# Patient Record
Sex: FEMALE | Race: WHITE
[De-identification: names, ages, dates, MRNs, and addresses within clinical notes are randomized per-mention and may not be internally consistent; named-entity substitution may affect disease eponyms.]

---

## 2018-10-01 PROBLEM — Z00.00 ENCOUNTER FOR PREVENTIVE HEALTH EXAMINATION: Status: ACTIVE | Noted: 2018-10-01

## 2018-10-31 ENCOUNTER — APPOINTMENT (OUTPATIENT)
Dept: CARDIOLOGY | Facility: CLINIC | Age: 53
End: 2018-10-31
Payer: COMMERCIAL

## 2018-10-31 ENCOUNTER — NON-APPOINTMENT (OUTPATIENT)
Age: 53
End: 2018-10-31

## 2018-10-31 VITALS
WEIGHT: 156 LBS | HEART RATE: 56 BPM | DIASTOLIC BLOOD PRESSURE: 68 MMHG | SYSTOLIC BLOOD PRESSURE: 110 MMHG | HEIGHT: 64 IN | BODY MASS INDEX: 26.63 KG/M2 | OXYGEN SATURATION: 98 %

## 2018-10-31 PROCEDURE — 99205 OFFICE O/P NEW HI 60 MIN: CPT

## 2018-10-31 PROCEDURE — 93000 ELECTROCARDIOGRAM COMPLETE: CPT

## 2018-10-31 RX ORDER — ESCITALOPRAM OXALATE 10 MG/1
10 TABLET, FILM COATED ORAL DAILY
Refills: 0 | Status: ACTIVE | COMMUNITY

## 2018-10-31 RX ORDER — POTASSIUM BICARB/MAG COMBO 21 500-300 MG
POWDER EFFERVESCENT (GRAM) ORAL
Refills: 0 | Status: ACTIVE | COMMUNITY

## 2018-10-31 RX ORDER — CHOLECALCIFEROL (VITAMIN D3) 125 MCG
5000 CAPSULE ORAL
Refills: 0 | Status: ACTIVE | COMMUNITY

## 2018-10-31 RX ORDER — ALPRAZOLAM 0.25 MG/1
0.25 TABLET ORAL
Refills: 0 | Status: ACTIVE | COMMUNITY

## 2018-10-31 RX ORDER — MULTIVITAMIN
TABLET ORAL
Refills: 0 | Status: ACTIVE | COMMUNITY

## 2018-11-05 PROCEDURE — 93224 XTRNL ECG REC UP TO 48 HRS: CPT

## 2018-11-06 ENCOUNTER — RECORD ABSTRACTING (OUTPATIENT)
Age: 53
End: 2018-11-06

## 2018-11-06 DIAGNOSIS — Z78.9 OTHER SPECIFIED HEALTH STATUS: ICD-10-CM

## 2018-11-09 ENCOUNTER — APPOINTMENT (OUTPATIENT)
Dept: CARDIOLOGY | Facility: CLINIC | Age: 53
End: 2018-11-09
Payer: COMMERCIAL

## 2018-11-09 VITALS
BODY MASS INDEX: 26.63 KG/M2 | HEART RATE: 50 BPM | WEIGHT: 156 LBS | DIASTOLIC BLOOD PRESSURE: 72 MMHG | SYSTOLIC BLOOD PRESSURE: 120 MMHG | OXYGEN SATURATION: 100 % | HEIGHT: 64 IN

## 2018-11-09 PROCEDURE — 99214 OFFICE O/P EST MOD 30 MIN: CPT

## 2018-11-09 NOTE — PHYSICAL EXAM
[General Appearance - Well Developed] : well developed [Normal Appearance] : normal appearance [Well Groomed] : well groomed [General Appearance - Well Nourished] : well nourished [No Deformities] : no deformities [General Appearance - In No Acute Distress] : no acute distress [Normal Conjunctiva] : the conjunctiva exhibited no abnormalities [Eyelids - No Xanthelasma] : the eyelids demonstrated no xanthelasmas [No Oral Pallor] : no oral pallor [No Oral Cyanosis] : no oral cyanosis [No Jugular Venous De León A Waves] : no jugular venous de león A waves [Respiration, Rhythm And Depth] : normal respiratory rhythm and effort [Exaggerated Use Of Accessory Muscles For Inspiration] : no accessory muscle use [Auscultation Breath Sounds / Voice Sounds] : lungs were clear to auscultation bilaterally [Heart Rate And Rhythm] : heart rate and rhythm were normal [Heart Sounds] : normal S1 and S2 [Murmurs] : no murmurs present [Abdomen Soft] : soft [Abdomen Tenderness] : non-tender [Abdomen Mass (___ Cm)] : no abdominal mass palpated [Abnormal Walk] : normal gait [Gait - Sufficient For Exercise Testing] : the gait was sufficient for exercise testing [Nail Clubbing] : no clubbing of the fingernails [Cyanosis, Localized] : no localized cyanosis [Petechial Hemorrhages (___cm)] : no petechial hemorrhages [Skin Color & Pigmentation] : normal skin color and pigmentation [] : no rash [No Venous Stasis] : no venous stasis [Skin Lesions] : no skin lesions [No Skin Ulcers] : no skin ulcer [No Xanthoma] : no  xanthoma was observed [Oriented To Time, Place, And Person] : oriented to person, place, and time [Affect] : the affect was normal [Mood] : the mood was normal [No Anxiety] : not feeling anxious [Edema] : no peripheral edema present

## 2018-11-09 NOTE — HISTORY OF PRESENT ILLNESS
[FreeTextEntry1] : Ms. Torres is a very pleasant 53-year-old patient looking much younger than stated age came for cardiac evaluation. Last seen by Dr. Peterson on 10/31/18.  Apparently, her boyfriend had kidney failure and she is planning for kidney donation.  Second biggest issue is that she has father who was not aware of any kind of coronary artery disease, was it asymptomatic, he had massive heart attack at age 60, at that time he was told to have cardiomyopathy, subsequently had heart transplantation and he survived four years after heart transplantation.  The patient is very much concerned about underlying CAD.\par \par The patient is very active and does get short of breath easily but denies any beatriz chest pain associated with exertion. There is occasional chest tightness that she attributes to taking a new yoga class. She was having this tightness while wearing her holter monitor and was told to come in for reevaluation because of symptoms. She is aware of the heart beat.  She also mentions she gets tachycardic very fast whenever she does any activity.  She denies any PND, orthopnea, diaphoresis, dizziness, or pedal edema. \par \par No prior history of CHF, MI, or syncope.\par \par No history of diabetes, hypertension, or high cholesterol.\par \par CAD RISK FACTORS:\par Her CAD risk factor includes strong family history of coronary artery disease.\par \par Holter monitor has been reviewed. Normal sinus rhythm range , average of 64. Rare PACs were noted. \par

## 2018-11-16 ENCOUNTER — APPOINTMENT (OUTPATIENT)
Dept: CARDIOLOGY | Facility: CLINIC | Age: 53
End: 2018-11-16
Payer: COMMERCIAL

## 2018-11-16 PROCEDURE — 93306 TTE W/DOPPLER COMPLETE: CPT

## 2018-12-06 ENCOUNTER — APPOINTMENT (OUTPATIENT)
Dept: CARDIOLOGY | Facility: CLINIC | Age: 53
End: 2018-12-06
Payer: COMMERCIAL

## 2018-12-06 PROCEDURE — 78452 HT MUSCLE IMAGE SPECT MULT: CPT

## 2018-12-06 PROCEDURE — 93015 CV STRESS TEST SUPVJ I&R: CPT

## 2018-12-06 PROCEDURE — A9502: CPT

## 2018-12-13 ENCOUNTER — APPOINTMENT (OUTPATIENT)
Dept: CARDIOLOGY | Facility: CLINIC | Age: 53
End: 2018-12-13
Payer: COMMERCIAL

## 2018-12-13 VITALS
WEIGHT: 153 LBS | HEIGHT: 64 IN | BODY MASS INDEX: 26.12 KG/M2 | DIASTOLIC BLOOD PRESSURE: 60 MMHG | SYSTOLIC BLOOD PRESSURE: 90 MMHG | OXYGEN SATURATION: 100 % | HEART RATE: 60 BPM

## 2018-12-13 DIAGNOSIS — I34.1 NONRHEUMATIC MITRAL (VALVE) PROLAPSE: ICD-10-CM

## 2018-12-13 DIAGNOSIS — K21.9 GASTRO-ESOPHAGEAL REFLUX DISEASE W/OUT ESOPHAGITIS: ICD-10-CM

## 2018-12-13 DIAGNOSIS — R07.89 OTHER CHEST PAIN: ICD-10-CM

## 2018-12-13 DIAGNOSIS — F41.9 ANXIETY DISORDER, UNSPECIFIED: ICD-10-CM

## 2018-12-13 DIAGNOSIS — Z82.49 FAMILY HISTORY OF ISCHEMIC HEART DISEASE AND OTHER DISEASES OF THE CIRCULATORY SYSTEM: ICD-10-CM

## 2018-12-13 DIAGNOSIS — R00.0 TACHYCARDIA, UNSPECIFIED: ICD-10-CM

## 2018-12-13 DIAGNOSIS — R94.31 ABNORMAL ELECTROCARDIOGRAM [ECG] [EKG]: ICD-10-CM

## 2018-12-13 PROCEDURE — 99214 OFFICE O/P EST MOD 30 MIN: CPT

## 2018-12-13 NOTE — ASSESSMENT
[FreeTextEntry1] : Tachycardia.  No documented  arrhythmia on  Holter monitor  .  She has been advised to avoid caffeine products.\par \par History of mitral valve prolapse.  Echocardiogram confirmed normal  LV wall motion, LVEF.\par \par Abnormal EKG showing Q waves ,as well as inferior T inversion, rule out CAD.   Exercise nuclear exercise stress test- normal\par \par Aggressive risk factor modification has been discussed with her at a great length.  She will be reevaluated by me as necessary.\par \par

## 2018-12-13 NOTE — HISTORY OF PRESENT ILLNESS
[FreeTextEntry1] : Ms. Torres is a very pleasant 53-year-old patient looking much younger than stated agehad  cardiac evaluation on 10/31/18. Today she is here for cardiac testing follow up.\par \par 10/31/18    HM- no arrhythmias\par \par 11/16/18    Echo- normal LVEF without valvular abnormality\par \par 12/6/18     Exercise nuclear stress test - no infarct or ischemia on perfusion scan at a good work load. \par \par Apparently, her boyfriend had kidney failure and she is planning for kidney donation.  Second biggest issue is that she has father who was not aware of any kind of coronary artery disease, was it asymptomatic, he had massive heart attack at age 60, at that time he was told to have cardiomyopathy, subsequently had heart transplantation and he survived four years after heart transplantation.  The patient is very much concerned about underlying CAD.\par \par The patient is very active and does get short of breath easily but denies any chest pain, but she is aware of the heart.  She also mentions she gets tachycardic very fast whenever she does any activity.  She denies any PND, orthopnea, diaphoresis, dizziness, or pedal edema. \par \par No prior history of CHF, MI, or syncope.\par \par No history of diabetes, hypertension, or high cholesterol.\par \par CAD RISK FACTORS:\par Her CAD risk factor includes strong family history of coronary artery disease.\par

## 2018-12-13 NOTE — PHYSICAL EXAM
[General Appearance - Well Developed] : well developed [Normal Appearance] : normal appearance [Well Groomed] : well groomed [General Appearance - Well Nourished] : well nourished [No Deformities] : no deformities [General Appearance - In No Acute Distress] : no acute distress [Normal Conjunctiva] : the conjunctiva exhibited no abnormalities [Eyelids - No Xanthelasma] : the eyelids demonstrated no xanthelasmas [Normal Oral Mucosa] : normal oral mucosa [No Oral Pallor] : no oral pallor [No Oral Cyanosis] : no oral cyanosis [Normal Jugular Venous A Waves Present] : normal jugular venous A waves present [Normal Jugular Venous V Waves Present] : normal jugular venous V waves present [No Jugular Venous De León A Waves] : no jugular venous de león A waves [Respiration, Rhythm And Depth] : normal respiratory rhythm and effort [Exaggerated Use Of Accessory Muscles For Inspiration] : no accessory muscle use [Auscultation Breath Sounds / Voice Sounds] : lungs were clear to auscultation bilaterally [Heart Rate And Rhythm] : heart rate and rhythm were normal [Heart Sounds] : normal S1 and S2 [Murmurs] : no murmurs present [Abdomen Soft] : soft [Abdomen Tenderness] : non-tender [Abdomen Mass (___ Cm)] : no abdominal mass palpated [Abnormal Walk] : normal gait [Gait - Sufficient For Exercise Testing] : the gait was sufficient for exercise testing [Nail Clubbing] : no clubbing of the fingernails [Cyanosis, Localized] : no localized cyanosis [Petechial Hemorrhages (___cm)] : no petechial hemorrhages [Skin Color & Pigmentation] : normal skin color and pigmentation [] : no rash [No Venous Stasis] : no venous stasis [Skin Lesions] : no skin lesions [No Skin Ulcers] : no skin ulcer [No Xanthoma] : no  xanthoma was observed [Oriented To Time, Place, And Person] : oriented to person, place, and time [Affect] : the affect was normal [Mood] : the mood was normal [No Anxiety] : not feeling anxious

## 2019-03-31 NOTE — ASSESSMENT
[FreeTextEntry1] : YANDEL LOPEZ is a 53 year old F who presents today Nov 09, 2018 with the above history and the following active issues: \par \par Tachycardia. No palpitations. Holter reviewed. No dysrhythmias. Max  bpm. She has been advised to avoid caffeine products and other stimulants.\par \par History of mitral valve prolapse.  Echocardiogram for LV wall motion, LVEF.\par \par Chest tightness. Abnormal EKG showing Q waves ,as well as inferior T inversion, rule out CAD.  Recommended nuclear exercise stress test to evaluate for myocardial ischemia. Advised to go to ED for emergent evaluation if chest discomfort worsens in the interim. \par \par F/U after testing to review results.\par Discussed red flag symptoms, which would warrant more urgent medical evaluation.\par Any questions and concerns were addressed and resolved. \par \par Sincerely,\par \par PALMIRA Ríos\par Patients history, testing, and plan reviewed with supervising MD: Dr. David Constantino \par \par \par 
normal LV function

## 2019-09-06 ENCOUNTER — TRANSCRIPTION ENCOUNTER (OUTPATIENT)
Age: 54
End: 2019-09-06

## 2024-10-23 ENCOUNTER — NON-APPOINTMENT (OUTPATIENT)
Age: 59
End: 2024-10-23